# Patient Record
Sex: FEMALE | Race: WHITE | NOT HISPANIC OR LATINO
[De-identification: names, ages, dates, MRNs, and addresses within clinical notes are randomized per-mention and may not be internally consistent; named-entity substitution may affect disease eponyms.]

---

## 2019-03-08 PROBLEM — Z00.00 ENCOUNTER FOR PREVENTIVE HEALTH EXAMINATION: Status: ACTIVE | Noted: 2019-03-08

## 2019-05-14 ENCOUNTER — FORM ENCOUNTER (OUTPATIENT)
Age: 49
End: 2019-05-14

## 2019-06-11 ENCOUNTER — FORM ENCOUNTER (OUTPATIENT)
Age: 49
End: 2019-06-11

## 2021-07-21 ENCOUNTER — APPOINTMENT (OUTPATIENT)
Dept: OBGYN | Facility: CLINIC | Age: 51
End: 2021-07-21
Payer: COMMERCIAL

## 2021-07-21 VITALS
BODY MASS INDEX: 22.76 KG/M2 | HEIGHT: 67 IN | DIASTOLIC BLOOD PRESSURE: 72 MMHG | SYSTOLIC BLOOD PRESSURE: 122 MMHG | WEIGHT: 145 LBS

## 2021-07-21 DIAGNOSIS — Z83.2 FAMILY HISTORY OF DISEASES OF THE BLOOD AND BLOOD-FORMING ORGANS AND CERTAIN DISORDERS INVOLVING THE IMMUNE MECHANISM: ICD-10-CM

## 2021-07-21 DIAGNOSIS — Z78.9 OTHER SPECIFIED HEALTH STATUS: ICD-10-CM

## 2021-07-21 DIAGNOSIS — N92.0 EXCESSIVE AND FREQUENT MENSTRUATION WITH REGULAR CYCLE: ICD-10-CM

## 2021-07-21 DIAGNOSIS — Z87.42 PERSONAL HISTORY OF OTHER DISEASES OF THE FEMALE GENITAL TRACT: ICD-10-CM

## 2021-07-21 DIAGNOSIS — Z01.411 ENCOUNTER FOR GYNECOLOGICAL EXAMINATION (GENERAL) (ROUTINE) WITH ABNORMAL FINDINGS: ICD-10-CM

## 2021-07-21 DIAGNOSIS — Z83.79 FAMILY HISTORY OF OTHER DISEASES OF THE DIGESTIVE SYSTEM: ICD-10-CM

## 2021-07-21 DIAGNOSIS — N95.2 POSTMENOPAUSAL ATROPHIC VAGINITIS: ICD-10-CM

## 2021-07-21 DIAGNOSIS — O14.20 HELLP SYNDROME (HELLP), UNSPECIFIED TRIMESTER: ICD-10-CM

## 2021-07-21 DIAGNOSIS — G47.00 INSOMNIA, UNSPECIFIED: ICD-10-CM

## 2021-07-21 DIAGNOSIS — Z86.2 PERSONAL HISTORY OF DISEASES OF THE BLOOD AND BLOOD-FORMING ORGANS AND CERTAIN DISORDERS INVOLVING THE IMMUNE MECHANISM: ICD-10-CM

## 2021-07-21 PROCEDURE — 99386 PREV VISIT NEW AGE 40-64: CPT

## 2021-07-21 PROCEDURE — 99213 OFFICE O/P EST LOW 20 MIN: CPT | Mod: 25

## 2021-07-21 NOTE — DISCUSSION/SUMMARY
[FreeTextEntry1] : Patient offered bilateral screening mammogram and was advised that at the present time mammography is the standard of care for breast imaging/screening.\par Prescribed pelvic ultrasound\par Prescribed hormone profile\par Discussed issues regarding irregular menstrual periods including various etiologies, diagnostic and treatment options.\par Patient appears interested in endometrial ablation at this time.\par Keep menstrual calendar\par Follow-up 6 months or as needed

## 2021-07-21 NOTE — HISTORY OF PRESENT ILLNESS
[FreeTextEntry1] : Patient is 51 years old para 2-0-0-2 last menstrual period July 2, 2021.\par Patient states that she has a history of regular menstrual cycles.  Menstrual cycles began to become irregular in March 2021, she had no menstrual cycle in April, noted regular menstrual cycles in May and June, in July she bled for 12 days.  Patient states that she suffers from intermittent insomnia.  She has been using thermography for breast screening prescribed by Dr. Malik.

## 2021-07-21 NOTE — PHYSICAL EXAM
[Appropriately responsive] : appropriately responsive [Alert] : alert [No Acute Distress] : no acute distress [No Lymphadenopathy] : no lymphadenopathy [Regular Rate Rhythm] : regular rate rhythm [No Murmurs] : no murmurs [Clear to Auscultation B/L] : clear to auscultation bilaterally [Soft] : soft [Non-tender] : non-tender [Non-distended] : non-distended [No HSM] : No HSM [No Lesions] : no lesions [No Mass] : no mass [Oriented x3] : oriented x3 [Examination Of The Breasts] : a normal appearance [No Masses] : no breast masses were palpable [Labia Majora] : normal [Labia Minora] : normal [Cervical Stenosis] : cervical stenosis [Normal] : normal [Uterine Adnexae] : normal [FreeTextEntry5] : Mild cervical stenosis noted

## 2021-08-10 ENCOUNTER — APPOINTMENT (OUTPATIENT)
Dept: OBGYN | Facility: CLINIC | Age: 51
End: 2021-08-10
Payer: COMMERCIAL

## 2021-08-10 VITALS
SYSTOLIC BLOOD PRESSURE: 120 MMHG | BODY MASS INDEX: 22.76 KG/M2 | HEIGHT: 67 IN | WEIGHT: 145 LBS | DIASTOLIC BLOOD PRESSURE: 70 MMHG

## 2021-08-10 DIAGNOSIS — N95.1 MENOPAUSAL AND FEMALE CLIMACTERIC STATES: ICD-10-CM

## 2021-08-10 DIAGNOSIS — Z87.42 PERSONAL HISTORY OF OTHER DISEASES OF THE FEMALE GENITAL TRACT: ICD-10-CM

## 2021-08-10 DIAGNOSIS — D25.1 INTRAMURAL LEIOMYOMA OF UTERUS: ICD-10-CM

## 2021-08-10 PROCEDURE — 99214 OFFICE O/P EST MOD 30 MIN: CPT

## 2021-08-10 NOTE — HISTORY OF PRESENT ILLNESS
[FreeTextEntry1] : Patient is 51 years old para 2-0-0-2 last menstrual period July 1, 2021\par Patient states that she has a history of irregular menses\par She is status post pelvic ultrasound and hormone profile here to discuss results\par Labs reviewed with patient noted hemoglobin 10.4 hematocrit 33.6.  FSH 38.5, estradiol 390\par Pelvic ultrasound revealed the uterus to be anteverted and measures 9.3 cm, in the posterior uterine body there is an 8 mm x 9 mm x 9 mm intramural myoma there is a 1.9 cm simple left ovarian cyst.  In the endocervical canal there is a 2.3 cm long transverse endocervical polyp present.

## 2021-08-10 NOTE — DISCUSSION/SUMMARY
[FreeTextEntry1] : Issues regarding findings discussed with patient\par Advised hysteroscopy D&C for further evaluation\par Patient is aware of risks benefits and alternatives regarding surgical procedure.\par Patient will consult with her hematologist regarding history of antiphospholipid antibody syndrome

## 2021-11-10 NOTE — REASON FOR VISIT
Never smoker [Follow-Up] : a follow-up evaluation of [Abnormal Uterine Bleeding] : abnormal uterine bleeding

## 2022-02-17 ENCOUNTER — APPOINTMENT (OUTPATIENT)
Dept: OBGYN | Facility: CLINIC | Age: 52
End: 2022-02-17
Payer: COMMERCIAL

## 2022-02-17 ENCOUNTER — NON-APPOINTMENT (OUTPATIENT)
Age: 52
End: 2022-02-17

## 2022-02-17 VITALS
HEART RATE: 85 BPM | SYSTOLIC BLOOD PRESSURE: 119 MMHG | WEIGHT: 142 LBS | BODY MASS INDEX: 22.29 KG/M2 | HEIGHT: 67 IN | DIASTOLIC BLOOD PRESSURE: 72 MMHG

## 2022-02-17 DIAGNOSIS — N90.89 OTHER SPECIFIED NONINFLAMMATORY DISORDERS OF VULVA AND PERINEUM: ICD-10-CM

## 2022-02-17 PROCEDURE — 99213 OFFICE O/P EST LOW 20 MIN: CPT | Mod: 25

## 2022-02-17 PROCEDURE — 76830 TRANSVAGINAL US NON-OB: CPT

## 2022-02-17 NOTE — HISTORY OF PRESENT ILLNESS
[FreeTextEntry1] : here due to a polyp that needs to be removed as per her previous MD.....requests another opinion as to the management.\par Her sonogram says that it is an endocervical polyp 2.5cm long  x 1cm wide

## 2022-02-17 NOTE — PROCEDURE
[Transvaginal Ultrasound] : transvaginal ultrasound [FreeTextEntry3] : endocervical polyp difficult to visualize....debris?\par will repeat in 3 mths\par no free fluid [FreeTextEntry5] : 75.2cc volume endo 7.5mm [FreeTextEntry7] : 2.7cc [FreeTextEntry8] : 4.2cc

## 2022-02-17 NOTE — PHYSICAL EXAM
[Labia Majora] : normal [Labia Minora] : normal [Normal] : normal [Uterine Adnexae] : normal [FreeTextEntry1] : small vulvar lesion, left upper mons, smaller than previously

## 2022-05-19 ENCOUNTER — APPOINTMENT (OUTPATIENT)
Dept: OBGYN | Facility: CLINIC | Age: 52
End: 2022-05-19
Payer: COMMERCIAL

## 2022-05-19 VITALS
DIASTOLIC BLOOD PRESSURE: 78 MMHG | HEART RATE: 77 BPM | SYSTOLIC BLOOD PRESSURE: 111 MMHG | BODY MASS INDEX: 22.29 KG/M2 | WEIGHT: 142 LBS | TEMPERATURE: 98.1 F | HEIGHT: 67 IN

## 2022-05-19 DIAGNOSIS — D68.59 OTHER PRIMARY THROMBOPHILIA: ICD-10-CM

## 2022-05-19 DIAGNOSIS — N92.6 IRREGULAR MENSTRUATION, UNSPECIFIED: ICD-10-CM

## 2022-05-19 PROCEDURE — 76830 TRANSVAGINAL US NON-OB: CPT

## 2022-05-19 PROCEDURE — 99213 OFFICE O/P EST LOW 20 MIN: CPT | Mod: 25

## 2022-05-19 NOTE — PROCEDURE
[Transvaginal Ultrasound] : transvaginal ultrasound [FreeTextEntry3] : endocervical polyp: circ: 5.3cm, area 1.7cm2.  (20 x 18x 11 mm)\par no free fluid [FreeTextEntry5] : 126cc vol,  endometrium:7mm trilaminar [FreeTextEntry7] : 15.3cc follicle [FreeTextEntry8] : 13.8cc follicle

## 2022-05-19 NOTE — HISTORY OF PRESENT ILLNESS
[Irregular Menstrual Interval] : irregular menstrual interval [FreeTextEntry1] : here for fu of her endocervical polyp\par periods are irregular

## 2023-02-14 ENCOUNTER — APPOINTMENT (OUTPATIENT)
Dept: ORTHOPEDIC SURGERY | Facility: CLINIC | Age: 53
End: 2023-02-14
Payer: COMMERCIAL

## 2023-02-14 ENCOUNTER — RESULT CHARGE (OUTPATIENT)
Age: 53
End: 2023-02-14

## 2023-02-14 VITALS — WEIGHT: 145 LBS | HEIGHT: 67 IN | BODY MASS INDEX: 22.76 KG/M2

## 2023-02-14 DIAGNOSIS — S49.91XA UNSPECIFIED INJURY OF RIGHT SHOULDER AND UPPER ARM, INITIAL ENCOUNTER: ICD-10-CM

## 2023-02-14 PROCEDURE — 73030 X-RAY EXAM OF SHOULDER: CPT | Mod: RT

## 2023-02-14 PROCEDURE — 99203 OFFICE O/P NEW LOW 30 MIN: CPT

## 2023-02-14 NOTE — PHYSICAL EXAM
[] : motor and sensory intact distally [Right] : right shoulder [There are no fractures, subluxations or dislocations. No significant abnormalities are seen] : There are no fractures, subluxations or dislocations. No significant abnormalities are seen [FreeTextEntry3] : On examination of the right shoulder no swelling, no ecchymosis, no erythema.\par No bony abnormalities.\par No deformity, no atrophy. [FreeTextEntry8] : Patient has tenderness when palpating over the periscapular border, patient has some tenderness over the posterior aspect of the shoulder. [FreeTextEntry9] : Patient has limited range of motion actively and passively, patient can only forward flex to about 110 degrees and abduct to about 85 degrees.  Patient is able to reach behind her ear with external rotation and she is only able to reach her buttocks with internal rotation.  She has significant pain with range of motion. [de-identified] : Positive resisted supraspinatus

## 2023-02-14 NOTE — DISCUSSION/SUMMARY
[de-identified] : Impression: Injury to the right shoulder and possible rotator cuff tear and adhesive capsulitis\par \par Plan: At this time since patient has been doing physical therapy since December, I would like for her to have an MRI of the right shoulder to evaluate a possible rotator cuff tear and adhesive capsulitis.\par Patient was advised to continue with therapy, patient was given a prescription for therapy.\par Patient was advised to do activities as tolerated.\par Patient was instructed how to do exercises at home so she can continue doing range of motion exercises to prevent worsening of her condition.\par \par \par Follow-up: 4 to 6 weeks.\par \par Supervising physician Dr. Manzanares

## 2023-02-14 NOTE — HISTORY OF PRESENT ILLNESS
[de-identified] : 52-year-old female here for an evaluation pain to the right shoulder, patient states that on November 4th, 2022 she fell down the stairs and that she fell down the stairs she was holding to the banister, patient stated at that time she has significant pain, patient treated conservatively with ice pack and the pain improved, patient states that she was ice-skating with her daughter on December and she fell down landing on her back after that fall the pain on the shoulder has been severe, patient states that she was seen by her medical provider who referred her for physical therapy, patient has been doing deep tissue massage and therapy to the left shoulder for about 6 weeks and she still struggling with range of motion and pain.\par At this time her range of motion is significantly decreased.\par Patient is right-hand dominant.\par Patient is a teacher.

## 2023-02-23 ENCOUNTER — APPOINTMENT (OUTPATIENT)
Dept: MRI IMAGING | Facility: CLINIC | Age: 53
End: 2023-02-23
Payer: COMMERCIAL

## 2023-02-23 PROCEDURE — 73221 MRI JOINT UPR EXTREM W/O DYE: CPT | Mod: RT

## 2023-03-28 ENCOUNTER — APPOINTMENT (OUTPATIENT)
Dept: ORTHOPEDIC SURGERY | Facility: CLINIC | Age: 53
End: 2023-03-28
Payer: COMMERCIAL

## 2023-03-28 DIAGNOSIS — M25.511 PAIN IN RIGHT SHOULDER: ICD-10-CM

## 2023-03-28 PROCEDURE — 99213 OFFICE O/P EST LOW 20 MIN: CPT

## 2023-03-28 NOTE — HISTORY OF PRESENT ILLNESS
[de-identified] : Patient is a 52-year-old female who reports to the office for subsequent reevaluation of her right shoulder pain.  She had an MRI done and would like to go over the results of that.  Certain range of motion and palpating certain areas of the shoulder aggravate the patient's pain at times.  She has been doing physical therapy which has been giving her relief.  Denies any numbness or tingling.

## 2023-03-28 NOTE — PHYSICAL EXAM
[Right] : right shoulder [Sitting] : sitting [4 ___] : forward flexion 4[unfilled]/5 [4___] : internal rotation 4[unfilled]/5 [] : motor and sensory intact distally [de-identified] : Positive Michael's test [TWNoteComboBox7] : active forward flexion 150 degrees [TWNoteComboBox4] : passive forward flexion 170 degrees [de-identified] : active abduction 130 degrees [TWNoteComboBox9] : passive abduction 150 degrees [TWNoteComboBox6] : internal rotation L3 [de-identified] : external rotation 90 degrees

## 2023-03-28 NOTE — IMAGING
[de-identified] : Right shoulder MRI from 2/23/2023 reviewed with the patient in detail.  It revealed the following:\par IMPRESSION:\par 1. Minimal tendinitis, atrophy and undersurface fraying versus small partial undersurface tears in the distal supraspinatus \par tendon at its insertion.\par 2. Mild arthrosis and inferior ossific spurring at the AC joint with a type 1 acromion causes slight narrowing of the sub-acromial \par space.\par 3. Minimal subacromial bursitis. Trace glenohumeral joint effusion. Inferior glenohumeral ligament sprain.

## 2023-03-28 NOTE — DISCUSSION/SUMMARY
[de-identified] : Explained MRI results and patient would like to continue conservative management.  She will take OTC Tylenol or Motrin as needed for pain.  The patient was advised to rest/ice the area and may alternate with warm compresses as needed.\par \par We will continue therapy as directed and a new PT prescription was provided for her.  The shoulder conditioning program from the [facility] was given to the patient so they may try that at home.\par \par She kindly declined a cortisone injection today.  The patient will follow-up in 3 months for further evaluation.  All of the patient's questions/concerns were answered in detail.\par \par The patient was seen under the supervision of Dr. Ruiz.

## 2023-06-29 ENCOUNTER — APPOINTMENT (OUTPATIENT)
Dept: ORTHOPEDIC SURGERY | Facility: CLINIC | Age: 53
End: 2023-06-29

## 2023-06-30 ENCOUNTER — NON-APPOINTMENT (OUTPATIENT)
Age: 53
End: 2023-06-30

## 2023-11-06 ENCOUNTER — NON-APPOINTMENT (OUTPATIENT)
Age: 53
End: 2023-11-06

## 2023-11-20 ENCOUNTER — NON-APPOINTMENT (OUTPATIENT)
Age: 53
End: 2023-11-20

## 2023-11-20 DIAGNOSIS — R93.89 ABNORMAL FINDINGS ON DIAGNOSTIC IMAGING OF OTHER SPECIFIED BODY STRUCTURES: ICD-10-CM

## 2023-12-14 ENCOUNTER — NON-APPOINTMENT (OUTPATIENT)
Age: 53
End: 2023-12-14

## 2023-12-14 PROBLEM — R93.89 THICKENED ENDOMETRIUM: Status: ACTIVE | Noted: 2023-12-14

## 2023-12-18 ENCOUNTER — APPOINTMENT (OUTPATIENT)
Dept: OBGYN | Facility: CLINIC | Age: 53
End: 2023-12-18
Payer: COMMERCIAL

## 2023-12-18 VITALS
DIASTOLIC BLOOD PRESSURE: 68 MMHG | WEIGHT: 143 LBS | SYSTOLIC BLOOD PRESSURE: 120 MMHG | HEIGHT: 67 IN | BODY MASS INDEX: 22.44 KG/M2

## 2023-12-18 DIAGNOSIS — Z01.419 ENCOUNTER FOR GYNECOLOGICAL EXAMINATION (GENERAL) (ROUTINE) W/OUT ABNORMAL FINDINGS: ICD-10-CM

## 2023-12-18 DIAGNOSIS — N39.0 URINARY TRACT INFECTION, SITE NOT SPECIFIED: ICD-10-CM

## 2023-12-18 PROCEDURE — 76830 TRANSVAGINAL US NON-OB: CPT

## 2023-12-18 PROCEDURE — 99396 PREV VISIT EST AGE 40-64: CPT | Mod: 25

## 2023-12-18 PROCEDURE — 99213 OFFICE O/P EST LOW 20 MIN: CPT | Mod: 25

## 2023-12-18 RX ORDER — NITROFURANTOIN MACROCRYSTALS 25 MG/1
25 CAPSULE ORAL
Qty: 90 | Refills: 3 | Status: ACTIVE | COMMUNITY
Start: 2023-12-18 | End: 1900-01-01

## 2023-12-18 RX ORDER — NITROFURANTOIN (MONOHYDRATE/MACROCRYSTALS) 25; 75 MG/1; MG/1
100 CAPSULE ORAL
Qty: 14 | Refills: 1 | Status: ACTIVE | COMMUNITY
Start: 2023-12-18 | End: 1900-01-01

## 2023-12-18 NOTE — PROCEDURE
[Abnormal Uterine Bleeding] : abnormal uterine bleeding [Transvaginal Ultrasound] : transvaginal ultrasound [FreeTextEntry3] : endocervical polyp trace circ 3.4 cm trace area 0.66 cm2 cervix normal lining trilaminar...left ovary :  follicular  [FreeTextEntry5] : 100 cc vol, 8 mm trilaminar [FreeTextEntry7] : 3.3 cc [FreeTextEntry8] : 10.6  cc, 2 follicles

## 2023-12-18 NOTE — HISTORY OF PRESENT ILLNESS
[FreeTextEntry1] : here for her annual exam has recurrent uti...klebsiella known polyp lmp ...light...June 2023 labs show perimenopause: fsh and estradiol are elevated  [Irregular Menstrual Interval] : irregular menstrual interval

## 2023-12-25 LAB
CYTOLOGY CVX/VAG DOC THIN PREP: NORMAL
HPV HIGH+LOW RISK DNA PNL CVX: NOT DETECTED

## 2023-12-31 ENCOUNTER — TRANSCRIPTION ENCOUNTER (OUTPATIENT)
Age: 53
End: 2023-12-31

## 2024-01-29 ENCOUNTER — NON-APPOINTMENT (OUTPATIENT)
Age: 54
End: 2024-01-29

## 2024-01-29 DIAGNOSIS — Z12.39 ENCOUNTER FOR OTHER SCREENING FOR MALIGNANT NEOPLASM OF BREAST: ICD-10-CM

## 2024-03-26 ENCOUNTER — APPOINTMENT (OUTPATIENT)
Dept: OBGYN | Facility: CLINIC | Age: 54
End: 2024-03-26
Payer: COMMERCIAL

## 2024-03-26 VITALS
DIASTOLIC BLOOD PRESSURE: 75 MMHG | HEART RATE: 69 BPM | SYSTOLIC BLOOD PRESSURE: 107 MMHG | WEIGHT: 135 LBS | HEIGHT: 67 IN | BODY MASS INDEX: 21.19 KG/M2

## 2024-03-26 DIAGNOSIS — N88.2 STRICTURE AND STENOSIS OF CERVIX UTERI: ICD-10-CM

## 2024-03-26 DIAGNOSIS — N84.1 POLYP OF CERVIX UTERI: ICD-10-CM

## 2024-03-26 DIAGNOSIS — Z09 ENCOUNTER FOR FOLLOW-UP EXAMINATION AFTER COMPLETED TREATMENT FOR CONDITIONS OTHER THAN MALIGNANT NEOPLASM: ICD-10-CM

## 2024-03-26 PROCEDURE — 99213 OFFICE O/P EST LOW 20 MIN: CPT | Mod: 25

## 2024-03-26 PROCEDURE — 76830 TRANSVAGINAL US NON-OB: CPT

## 2024-03-26 NOTE — HISTORY OF PRESENT ILLNESS
[FreeTextEntry1] : here for fu of undocervical polyp, which had been stable cystic ovaries on and off

## 2024-03-26 NOTE — PROCEDURE
[Pelvic Mass] : pelvic mass [Transvaginal Ultrasound] : transvaginal ultrasound [FreeTextEntry5] : 57 cc vol, 3.5 mm [FreeTextEntry3] : no free fluid new left cyst and solid mass? vs bowel? cervix normal ec polyp smaller...trace circ 2.2 cm...area: 0.17 cm2 [FreeTextEntry7] : 4.6 cc [FreeTextEntry6] : need MRI clarification...markers and hoping that it is due to constipation [FreeTextEntry8] : 36.5 cc volume witha questionable second solid component; 33 cc Admission

## 2024-03-28 ENCOUNTER — LABORATORY RESULT (OUTPATIENT)
Age: 54
End: 2024-03-28

## 2024-04-01 ENCOUNTER — NON-APPOINTMENT (OUTPATIENT)
Age: 54
End: 2024-04-01

## 2024-05-07 ENCOUNTER — APPOINTMENT (OUTPATIENT)
Dept: OBGYN | Facility: CLINIC | Age: 54
End: 2024-05-07
Payer: COMMERCIAL

## 2024-05-07 DIAGNOSIS — R19.00 INTRA-ABDOMINAL AND PELVIC SWELLING, MASS AND LUMP, UNSPECIFIED SITE: ICD-10-CM

## 2024-05-07 PROCEDURE — 99213 OFFICE O/P EST LOW 20 MIN: CPT

## 2024-05-07 NOTE — HISTORY OF PRESENT ILLNESS
[FreeTextEntry1] : DISCUSSED MRI IN DETAIL SMALL CYST ON LEFT, BUT NO SOLID COMPONENT THE SOLID COMPONENT ON SONO WAS BOWEL AS PER MRI ALL MARKERS NEGATIVE, SO WE WILL OBSERVE DISCUSSED IN DETAIL AND PATIENT FULLY UNDERSTANDS RETURN 6 MTHS

## 2024-10-29 ENCOUNTER — APPOINTMENT (OUTPATIENT)
Dept: OBGYN | Facility: CLINIC | Age: 54
End: 2024-10-29
Payer: COMMERCIAL

## 2024-10-29 VITALS
BODY MASS INDEX: 22.6 KG/M2 | SYSTOLIC BLOOD PRESSURE: 107 MMHG | WEIGHT: 144 LBS | HEIGHT: 67 IN | HEART RATE: 69 BPM | DIASTOLIC BLOOD PRESSURE: 69 MMHG

## 2024-10-29 DIAGNOSIS — N83.01 FOLLICULAR CYST OF RIGHT OVARY: ICD-10-CM

## 2024-10-29 DIAGNOSIS — R19.00 INTRA-ABDOMINAL AND PELVIC SWELLING, MASS AND LUMP, UNSPECIFIED SITE: ICD-10-CM

## 2024-10-29 LAB
BILIRUB UR QL STRIP: NORMAL
CLARITY UR: CLEAR
COLLECTION METHOD: NORMAL
GLUCOSE UR-MCNC: NORMAL
HCG UR QL: 0.2 EU/DL
HGB UR QL STRIP.AUTO: NORMAL
KETONES UR-MCNC: NORMAL
LEUKOCYTE ESTERASE UR QL STRIP: NORMAL
NITRITE UR QL STRIP: NORMAL
PH UR STRIP: 6
PROT UR STRIP-MCNC: NORMAL
SP GR UR STRIP: 1

## 2024-10-29 PROCEDURE — 99459 PELVIC EXAMINATION: CPT

## 2024-10-29 PROCEDURE — 76830 TRANSVAGINAL US NON-OB: CPT

## 2024-10-29 PROCEDURE — 81003 URINALYSIS AUTO W/O SCOPE: CPT | Mod: QW

## 2024-10-29 PROCEDURE — 99213 OFFICE O/P EST LOW 20 MIN: CPT | Mod: 25

## 2025-01-10 ENCOUNTER — APPOINTMENT (OUTPATIENT)
Dept: OBGYN | Facility: CLINIC | Age: 55
End: 2025-01-10
Payer: COMMERCIAL

## 2025-01-10 ENCOUNTER — NON-APPOINTMENT (OUTPATIENT)
Age: 55
End: 2025-01-10

## 2025-01-10 VITALS
WEIGHT: 146 LBS | SYSTOLIC BLOOD PRESSURE: 108 MMHG | BODY MASS INDEX: 22.91 KG/M2 | DIASTOLIC BLOOD PRESSURE: 74 MMHG | HEIGHT: 67 IN | HEART RATE: 77 BPM

## 2025-01-10 DIAGNOSIS — Z01.419 ENCOUNTER FOR GYNECOLOGICAL EXAMINATION (GENERAL) (ROUTINE) W/OUT ABNORMAL FINDINGS: ICD-10-CM

## 2025-01-10 DIAGNOSIS — N92.6 IRREGULAR MENSTRUATION, UNSPECIFIED: ICD-10-CM

## 2025-01-10 DIAGNOSIS — N83.01 FOLLICULAR CYST OF RIGHT OVARY: ICD-10-CM

## 2025-01-10 PROCEDURE — 99459 PELVIC EXAMINATION: CPT

## 2025-01-10 PROCEDURE — 81003 URINALYSIS AUTO W/O SCOPE: CPT | Mod: QW

## 2025-01-10 PROCEDURE — 99386 PREV VISIT NEW AGE 40-64: CPT | Mod: 25

## 2025-01-10 PROCEDURE — 99396 PREV VISIT EST AGE 40-64: CPT

## 2025-01-10 PROCEDURE — 76830 TRANSVAGINAL US NON-OB: CPT

## 2025-01-10 PROCEDURE — 99213 OFFICE O/P EST LOW 20 MIN: CPT | Mod: 25

## 2025-01-17 LAB
CYTOLOGY CVX/VAG DOC THIN PREP: NORMAL
HPV HIGH+LOW RISK DNA PNL CVX: NOT DETECTED

## 2025-04-29 ENCOUNTER — APPOINTMENT (OUTPATIENT)
Dept: OBGYN | Facility: CLINIC | Age: 55
End: 2025-04-29
Payer: COMMERCIAL

## 2025-04-29 VITALS
DIASTOLIC BLOOD PRESSURE: 77 MMHG | HEIGHT: 67 IN | SYSTOLIC BLOOD PRESSURE: 111 MMHG | WEIGHT: 142 LBS | HEART RATE: 73 BPM | BODY MASS INDEX: 22.29 KG/M2

## 2025-04-29 DIAGNOSIS — N83.01 FOLLICULAR CYST OF RIGHT OVARY: ICD-10-CM

## 2025-04-29 DIAGNOSIS — N95.1 MENOPAUSAL AND FEMALE CLIMACTERIC STATES: ICD-10-CM

## 2025-04-29 PROCEDURE — 76830 TRANSVAGINAL US NON-OB: CPT

## 2025-04-29 PROCEDURE — 99459 PELVIC EXAMINATION: CPT | Mod: NC

## 2025-04-29 PROCEDURE — 99213 OFFICE O/P EST LOW 20 MIN: CPT

## 2025-07-16 ENCOUNTER — APPOINTMENT (OUTPATIENT)
Dept: OBGYN | Facility: CLINIC | Age: 55
End: 2025-07-16
Payer: COMMERCIAL

## 2025-07-16 VITALS
WEIGHT: 142 LBS | DIASTOLIC BLOOD PRESSURE: 76 MMHG | HEART RATE: 71 BPM | SYSTOLIC BLOOD PRESSURE: 111 MMHG | HEIGHT: 67 IN | BODY MASS INDEX: 22.29 KG/M2

## 2025-07-16 PROBLEM — N89.8 VAGINAL IRRITATION: Status: ACTIVE | Noted: 2025-07-16

## 2025-07-16 PROBLEM — N94.10 DYSPAREUNIA IN FEMALE: Status: ACTIVE | Noted: 2025-07-16

## 2025-07-16 LAB
BILIRUB UR QL STRIP: NEGATIVE
COLLECTION METHOD: NORMAL
GLUCOSE UR-MCNC: NEGATIVE
HCG UR QL: 0.2 EU/DL
HGB UR QL STRIP.AUTO: NEGATIVE
KETONES UR-MCNC: NEGATIVE
LEUKOCYTE ESTERASE UR QL STRIP: NORMAL
NITRITE UR QL STRIP: NEGATIVE
PH UR STRIP: 7
PROT UR STRIP-MCNC: NEGATIVE
SP GR UR STRIP: 1.01

## 2025-07-16 PROCEDURE — 99459 PELVIC EXAMINATION: CPT | Mod: NC

## 2025-07-16 PROCEDURE — 99213 OFFICE O/P EST LOW 20 MIN: CPT | Mod: 25

## 2025-07-16 PROCEDURE — 81003 URINALYSIS AUTO W/O SCOPE: CPT | Mod: QW

## 2025-07-21 LAB — BACTERIA UR CULT: ABNORMAL

## 2025-07-31 ENCOUNTER — NON-APPOINTMENT (OUTPATIENT)
Age: 55
End: 2025-07-31

## 2025-08-01 ENCOUNTER — APPOINTMENT (OUTPATIENT)
Dept: OBGYN | Facility: CLINIC | Age: 55
End: 2025-08-01
Payer: COMMERCIAL

## 2025-08-01 ENCOUNTER — LABORATORY RESULT (OUTPATIENT)
Age: 55
End: 2025-08-01

## 2025-08-01 VITALS
DIASTOLIC BLOOD PRESSURE: 73 MMHG | WEIGHT: 146 LBS | BODY MASS INDEX: 22.91 KG/M2 | HEIGHT: 67 IN | SYSTOLIC BLOOD PRESSURE: 109 MMHG | HEART RATE: 78 BPM

## 2025-08-01 DIAGNOSIS — N89.8 OTHER SPECIFIED NONINFLAMMATORY DISORDERS OF VAGINA: ICD-10-CM

## 2025-08-01 DIAGNOSIS — R39.89 OTHER SYMPTOMS AND SIGNS INVOLVING THE GENITOURINARY SYSTEM: ICD-10-CM

## 2025-08-01 LAB
BILIRUB UR QL STRIP: NEGATIVE
CLARITY UR: CLEAR
COLLECTION METHOD: NORMAL
GLUCOSE UR-MCNC: NEGATIVE
HCG UR QL: 0.2 EU/DL
HGB UR QL STRIP.AUTO: ABNORMAL
KETONES UR-MCNC: NEGATIVE
LEUKOCYTE ESTERASE UR QL STRIP: ABNORMAL
NITRITE UR QL STRIP: NEGATIVE
PH UR STRIP: 7
PROT UR STRIP-MCNC: NEGATIVE
SP GR UR STRIP: 1.01

## 2025-08-01 PROCEDURE — 81003 URINALYSIS AUTO W/O SCOPE: CPT | Mod: QW

## 2025-08-01 RX ORDER — SULFAMETHOXAZOLE AND TRIMETHOPRIM 800; 160 MG/1; MG/1
800-160 TABLET ORAL TWICE DAILY
Qty: 14 | Refills: 0 | Status: ACTIVE | COMMUNITY
Start: 2025-08-01 | End: 1900-01-01

## 2025-08-01 RX ORDER — AMOXICILLIN AND CLAVULANATE POTASSIUM 875; 125 MG/1; MG/1
875-125 TABLET, COATED ORAL
Qty: 14 | Refills: 0 | Status: COMPLETED | COMMUNITY
Start: 2025-07-21 | End: 2025-08-01

## 2025-08-05 DIAGNOSIS — N39.0 URINARY TRACT INFECTION, SITE NOT SPECIFIED: ICD-10-CM

## 2025-08-05 LAB
A VAGINAE DNA VAG QL NAA+PROBE: NORMAL
APPEARANCE: CLEAR
BACTERIA UR CULT: ABNORMAL
BILIRUBIN URINE: NEGATIVE
BLOOD URINE: NEGATIVE
BVAB2 DNA VAG QL NAA+PROBE: NORMAL
C KRUSEI DNA VAG QL NAA+PROBE: NEGATIVE
C TRACH RRNA SPEC QL NAA+PROBE: NEGATIVE
CANDIDA DNA VAG QL NAA+PROBE: NEGATIVE
COLOR: YELLOW
GLUCOSE QUALITATIVE U: NEGATIVE MG/DL
KETONES URINE: NEGATIVE MG/DL
LEUKOCYTE ESTERASE URINE: ABNORMAL
MEGA1 DNA VAG QL NAA+PROBE: NORMAL
N GONORRHOEA RRNA SPEC QL NAA+PROBE: NEGATIVE
NITRITE URINE: POSITIVE
PH URINE: 7.5
PROTEIN URINE: NEGATIVE MG/DL
SPECIFIC GRAVITY URINE: 1.01
T VAGINALIS RRNA SPEC QL NAA+PROBE: NEGATIVE
UROBILINOGEN URINE: 0.2 MG/DL

## 2025-08-05 RX ORDER — CIPROFLOXACIN HYDROCHLORIDE 500 MG/1
500 TABLET, FILM COATED ORAL TWICE DAILY
Qty: 14 | Refills: 0 | Status: ACTIVE | COMMUNITY
Start: 2025-08-05 | End: 1900-01-01